# Patient Record
Sex: MALE
[De-identification: names, ages, dates, MRNs, and addresses within clinical notes are randomized per-mention and may not be internally consistent; named-entity substitution may affect disease eponyms.]

---

## 2021-03-24 PROBLEM — Z00.00 ENCOUNTER FOR PREVENTIVE HEALTH EXAMINATION: Status: ACTIVE | Noted: 2021-03-24

## 2021-03-26 ENCOUNTER — APPOINTMENT (OUTPATIENT)
Dept: OTOLARYNGOLOGY | Facility: CLINIC | Age: 46
End: 2021-03-26
Payer: COMMERCIAL

## 2021-03-26 ENCOUNTER — TRANSCRIPTION ENCOUNTER (OUTPATIENT)
Age: 46
End: 2021-03-26

## 2021-03-26 VITALS — TEMPERATURE: 98.9 F | BODY MASS INDEX: 25.03 KG/M2 | HEIGHT: 69 IN | WEIGHT: 169 LBS

## 2021-03-26 DIAGNOSIS — H61.21 IMPACTED CERUMEN, RIGHT EAR: ICD-10-CM

## 2021-03-26 DIAGNOSIS — Z80.9 FAMILY HISTORY OF MALIGNANT NEOPLASM, UNSPECIFIED: ICD-10-CM

## 2021-03-26 DIAGNOSIS — H60.90 UNSPECIFIED OTITIS EXTERNA, UNSPECIFIED EAR: ICD-10-CM

## 2021-03-26 PROCEDURE — 99242 OFF/OP CONSLTJ NEW/EST SF 20: CPT

## 2021-03-26 PROCEDURE — 92557 COMPREHENSIVE HEARING TEST: CPT

## 2021-03-26 PROCEDURE — 99072 ADDL SUPL MATRL&STAF TM PHE: CPT

## 2021-03-26 PROCEDURE — 92567 TYMPANOMETRY: CPT

## 2021-03-26 RX ORDER — FLUTICASONE FUROATE AND VILANTEROL TRIFENATATE 200; 25 UG/1; UG/1
200-25 POWDER RESPIRATORY (INHALATION)
Refills: 0 | Status: ACTIVE | COMMUNITY

## 2021-03-26 NOTE — HISTORY OF PRESENT ILLNESS
[de-identified] : Patient seen in consultation for Dr. Angelo.  Reports recent right-sided hearing loss and otalgia in 2 weeks ago after swimming. His symptoms are improving spontaneously. The hearing is improved as well. He reports approximately 3-6 per year mostly associated with swimming

## 2021-03-26 NOTE — ASSESSMENT
[FreeTextEntry1] : Resolving right otitis externa. I have reassured him that his hearing is normal. I recommended alcohol and white vinegar drops in the future should this recur.  Followup as needed

## 2021-04-08 ENCOUNTER — RESULT CHARGE (OUTPATIENT)
Age: 46
End: 2021-04-08

## 2021-04-09 PROBLEM — H60.90 OTITIS EXTERNA: Status: ACTIVE | Noted: 2021-03-26
